# Patient Record
Sex: FEMALE | Race: OTHER | NOT HISPANIC OR LATINO
[De-identification: names, ages, dates, MRNs, and addresses within clinical notes are randomized per-mention and may not be internally consistent; named-entity substitution may affect disease eponyms.]

---

## 2020-03-09 PROBLEM — Z00.00 ENCOUNTER FOR PREVENTIVE HEALTH EXAMINATION: Status: ACTIVE | Noted: 2020-03-09

## 2020-04-27 ENCOUNTER — APPOINTMENT (OUTPATIENT)
Dept: OBGYN | Facility: CLINIC | Age: 50
End: 2020-04-27

## 2020-09-15 ENCOUNTER — RESULT REVIEW (OUTPATIENT)
Age: 50
End: 2020-09-15

## 2020-09-15 ENCOUNTER — OUTPATIENT (OUTPATIENT)
Dept: OUTPATIENT SERVICES | Facility: HOSPITAL | Age: 50
LOS: 1 days | Discharge: HOME | End: 2020-09-15
Payer: MEDICAID

## 2020-09-15 DIAGNOSIS — M54.2 CERVICALGIA: ICD-10-CM

## 2020-09-15 DIAGNOSIS — M25.511 PAIN IN RIGHT SHOULDER: ICD-10-CM

## 2020-09-15 DIAGNOSIS — M25.512 PAIN IN LEFT SHOULDER: ICD-10-CM

## 2020-09-15 PROCEDURE — 73030 X-RAY EXAM OF SHOULDER: CPT | Mod: 26,50

## 2020-09-15 PROCEDURE — 72050 X-RAY EXAM NECK SPINE 4/5VWS: CPT | Mod: 26

## 2021-02-23 ENCOUNTER — OUTPATIENT (OUTPATIENT)
Dept: OUTPATIENT SERVICES | Facility: HOSPITAL | Age: 51
LOS: 1 days | Discharge: HOME | End: 2021-02-23

## 2021-02-24 DIAGNOSIS — Z13.820 ENCOUNTER FOR SCREENING FOR OSTEOPOROSIS: ICD-10-CM

## 2021-02-24 DIAGNOSIS — F17.200 NICOTINE DEPENDENCE, UNSPECIFIED, UNCOMPLICATED: ICD-10-CM

## 2021-02-24 DIAGNOSIS — M89.9 DISORDER OF BONE, UNSPECIFIED: ICD-10-CM

## 2021-02-24 DIAGNOSIS — Z78.0 ASYMPTOMATIC MENOPAUSAL STATE: ICD-10-CM

## 2021-03-18 ENCOUNTER — APPOINTMENT (OUTPATIENT)
Dept: OTOLARYNGOLOGY | Facility: CLINIC | Age: 51
End: 2021-03-18
Payer: MEDICAID

## 2021-03-18 PROCEDURE — 92557 COMPREHENSIVE HEARING TEST: CPT

## 2021-03-18 PROCEDURE — 99203 OFFICE O/P NEW LOW 30 MIN: CPT | Mod: 25

## 2021-03-18 PROCEDURE — 92550 TYMPANOMETRY & REFLEX THRESH: CPT

## 2021-03-18 NOTE — PHYSICAL EXAM
[Midline] : trachea located in midline position [Normal] : no rashes [de-identified] : cerumen impaction bilaterally.

## 2021-03-18 NOTE — HISTORY OF PRESENT ILLNESS
[de-identified] : Patient presents today with c/o left ear tinnitus. Patient states tinnitus has been present for 2 years.Decreased hearing bilaterally.  She admits told to have wax build up in the right ear. She admits b/l otalgia. No h/o ear infections.

## 2021-07-22 ENCOUNTER — OUTPATIENT (OUTPATIENT)
Dept: OUTPATIENT SERVICES | Facility: HOSPITAL | Age: 51
LOS: 1 days | Discharge: HOME | End: 2021-07-22
Payer: MEDICAID

## 2021-07-22 DIAGNOSIS — R94.39 ABNORMAL RESULT OF OTHER CARDIOVASCULAR FUNCTION STUDY: ICD-10-CM

## 2021-07-22 PROCEDURE — 70544 MR ANGIOGRAPHY HEAD W/O DYE: CPT | Mod: 26

## 2022-02-24 ENCOUNTER — OUTPATIENT (OUTPATIENT)
Dept: OUTPATIENT SERVICES | Facility: HOSPITAL | Age: 52
LOS: 1 days | Discharge: HOME | End: 2022-02-24
Payer: MEDICAID

## 2022-02-24 DIAGNOSIS — E07.9 DISORDER OF THYROID, UNSPECIFIED: ICD-10-CM

## 2022-02-24 PROCEDURE — 76536 US EXAM OF HEAD AND NECK: CPT | Mod: 26

## 2022-03-07 ENCOUNTER — APPOINTMENT (OUTPATIENT)
Dept: OTOLARYNGOLOGY | Facility: CLINIC | Age: 52
End: 2022-03-07
Payer: MEDICAID

## 2022-03-07 PROCEDURE — 99213 OFFICE O/P EST LOW 20 MIN: CPT | Mod: 25

## 2022-03-07 PROCEDURE — 92550 TYMPANOMETRY & REFLEX THRESH: CPT

## 2022-03-07 PROCEDURE — 92557 COMPREHENSIVE HEARING TEST: CPT

## 2022-03-07 NOTE — PHYSICAL EXAM
[Normal] : mucosa is normal [Midline] : trachea located in midline position [de-identified] : left TMJ pain on palpation.

## 2022-03-07 NOTE — HISTORY OF PRESENT ILLNESS
[FreeTextEntry1] : Patient presents today with c/o hearing loss. She has hearing aids but does not use them because of the noise. Patient feels something in her ear. Pain at times.

## 2022-03-07 NOTE — ASSESSMENT
[FreeTextEntry1] : I reviewed, interpreted, and discussed the Audiogram done today. Asymmetric snhl. \par \par I explained to the patient the pathophysiology of TMJ dysfunction, causing referred otalgia. I showed the impact of an  uneven bite/occlusion. \par During painful episodes, I recommended using slightly warm compresses to relieve the spasm of the masticators muscles, eating soft food, masticating on both sides of the jaw instead of one side,  in addition to using NSAIDs. I also explained the risks of side effects related to NSAIDs including stomach ulcers and recommended gastric protection while using NSAIDs. \par I also discussed the importance of seeing the dentist to align the bite to avoid a recurrence of the problem down the road.\par

## 2022-04-11 ENCOUNTER — RESULT REVIEW (OUTPATIENT)
Age: 52
End: 2022-04-11

## 2022-04-11 ENCOUNTER — OUTPATIENT (OUTPATIENT)
Dept: OUTPATIENT SERVICES | Facility: HOSPITAL | Age: 52
LOS: 1 days | Discharge: HOME | End: 2022-04-11
Payer: MEDICAID

## 2022-04-11 DIAGNOSIS — H90.5 UNSPECIFIED SENSORINEURAL HEARING LOSS: ICD-10-CM

## 2022-04-11 PROCEDURE — 70553 MRI BRAIN STEM W/O & W/DYE: CPT | Mod: 26,76

## 2022-04-29 ENCOUNTER — APPOINTMENT (OUTPATIENT)
Dept: OTOLARYNGOLOGY | Facility: CLINIC | Age: 52
End: 2022-04-29
Payer: MEDICAID

## 2022-04-29 PROCEDURE — 99214 OFFICE O/P EST MOD 30 MIN: CPT

## 2022-04-29 NOTE — HISTORY OF PRESENT ILLNESS
[FreeTextEntry1] : Patient presents today following up on tinnitus and hearing loss. Patient continues to have tinnitus. Patient here to discuss MRI results.

## 2022-04-29 NOTE — ASSESSMENT
[FreeTextEntry1] : I personally reviewed, interpreted and discussed patient's MRI images. No CPA mass. \par

## 2023-03-08 ENCOUNTER — APPOINTMENT (OUTPATIENT)
Dept: PAIN MANAGEMENT | Facility: CLINIC | Age: 53
End: 2023-03-08
Payer: MEDICARE

## 2023-03-08 VITALS
HEART RATE: 68 BPM | HEIGHT: 59 IN | DIASTOLIC BLOOD PRESSURE: 87 MMHG | SYSTOLIC BLOOD PRESSURE: 107 MMHG | BODY MASS INDEX: 31.25 KG/M2 | WEIGHT: 155 LBS

## 2023-03-08 DIAGNOSIS — I20.8 OTHER FORMS OF ANGINA PECTORIS: ICD-10-CM

## 2023-03-08 DIAGNOSIS — Z87.39 PERSONAL HISTORY OF OTHER DISEASES OF THE MUSCULOSKELETAL SYSTEM AND CONNECTIVE TISSUE: ICD-10-CM

## 2023-03-08 DIAGNOSIS — Z86.59 PERSONAL HISTORY OF OTHER MENTAL AND BEHAVIORAL DISORDERS: ICD-10-CM

## 2023-03-08 DIAGNOSIS — Z86.79 PERSONAL HISTORY OF OTHER DISEASES OF THE CIRCULATORY SYSTEM: ICD-10-CM

## 2023-03-08 PROCEDURE — 99204 OFFICE O/P NEW MOD 45 MIN: CPT

## 2023-03-08 NOTE — REASON FOR VISIT
[Initial Visit] : an initial pain management visit [FreeTextEntry2] : PAIN ON LEFT SIDE OF THE BODY

## 2023-03-08 NOTE — ASSESSMENT
[FreeTextEntry1] : This is a 52 year old female with a history of multiple sclerosis with chief complaint of left sided body pain which has been ongoing for several years. She would like to be medically managed in our office. I will refill her above medications gabapentin, baclofen, and amitriptyline and she will follow up in 3 months for reevaluation. All this patients questions were answered and the conversation was understood well.\par \par I, Allison López, attest that this documentation has been prepared under the direction and in the presence of Provider Rea Champion MD.\par \par \par Thank you for allowing me to assist in the management of this patient. \par \par \par Best Regards, \par \par \par Rea Champion M.D., [facility]\par \par \par Diplomate, American Board of Physical Medicine and Rehabilitation\par Diplomate, American Board of Pain Medicine \par Diplomate, American Board of Pain Management\par

## 2023-03-08 NOTE — PHYSICAL EXAM
[Normal Coordination] : normal coordination [Normal DTR UE/LE] : normal DTR UE/LE  [Normal Sensation] : normal sensation [Normal Mood and Affect] : normal mood and affect [Orientated] : orientated [Able to Communicate] : able to communicate [Well Developed] : well developed [Well Nourished] : well nourished [] : lumbar paraspinal tenderness [Flexion] : flexion [Extension] : extension

## 2023-03-08 NOTE — DATA REVIEWED
[FreeTextEntry1] : SOAPP: moderate on 3/08/23\par Patient has a combination of moderate rise SOAP and no risk factors. UDS would be repeated randomly every quarter.\par \par UDS: No data obtained today.\par \par [place] REGISTRY: Checked.\par

## 2023-03-08 NOTE — HISTORY OF PRESENT ILLNESS
[FreeTextEntry1] : This is a 52 year old female here to establish care for multiple sclerosis and is experiencing pain on the entire left side of her body. She also has a history of a neuroma in the left foot. She was previously under the care of another pain management provider who is no longer covered under her insurance. The pain travels from the shoulder down to her feet. She experiences a burning sensation in the feet and there is numbness in the bottom of the heels. She would like to be medically managed at our office.  She was being prescribed Gabapentin 800 mg 3 times daily, Baclofen 10 mg 3 times daily, and Amitriptyline 10 mg at night and would like to continue with this regimen since it  provided her with good relief.  \par \par Of note, she is wearing a brace on her left hand for carpal tunnel syndrome which she still follows up with a hand specialist.\par \par The patient has had this pain for 20 years. The pain started after injury at work Patient describes pain as severe and constant. During the last month the pain has been worse during the a no typical pattern. Pain described as burning, sharp, pressure-like, dull aching, throbbing, shooting. Pain is associated with numbness and pins and needles into the upper and lower extremities. Patient has weakness in the upper lower extremities. Pain is increased with lying down. Pain is not changed with standing, sitting, walking, exercise, relaxation, coughing/sneezing, bowel movements. Bowel or bladder habits have not changed.\par \par ACTIVITIES: Patient could walk 0 blocks before the pain starts. Patient can sit 10 minutes before pain starts. Patient can stand 10 minutes before pain starts. The patient constantly lays down because of pain. Patient uses cane at this time. Patient has difficulty going to work, performing household chores, doing yd work or shopping, socializing with friends, participating in recreational activities, exercising at this time.\par \par PRIOR PAIN TREATMENTS: No relief with physical therapy.\par \par PRIOR PAIN MEDICATIONS:  Naproxen, Flexeril, baclofen, Zoloft, gabapentin.\par \par \par

## 2023-05-04 ENCOUNTER — APPOINTMENT (OUTPATIENT)
Dept: OTOLARYNGOLOGY | Facility: CLINIC | Age: 53
End: 2023-05-04
Payer: MEDICARE

## 2023-05-04 ENCOUNTER — APPOINTMENT (OUTPATIENT)
Dept: OTOLARYNGOLOGY | Facility: CLINIC | Age: 53
End: 2023-05-04

## 2023-05-04 DIAGNOSIS — H93.12 TINNITUS, LEFT EAR: ICD-10-CM

## 2023-05-04 DIAGNOSIS — H90.5 UNSPECIFIED SENSORINEURAL HEARING LOSS: ICD-10-CM

## 2023-05-04 PROCEDURE — 99213 OFFICE O/P EST LOW 20 MIN: CPT

## 2023-05-04 NOTE — HISTORY OF PRESENT ILLNESS
[Hearing Loss] : hearing loss [Ear Fullness] : ear fullness [Tinnitus] : tinnitus [None] : No associated symptoms are reported. [FreeTextEntry1] : PAtient returns today c/o tinnitus of ear , [facility]. Has been having left ear pain ,  come and goes . Constant tinnitus . Noticed hearing is a bit muffled . no discharge from left ear . Pt ahs hearing aids.  [Vertigo] : no vertigo

## 2023-05-04 NOTE — PHYSICAL EXAM
[Normal] : mucosa is normal [Midline] : trachea located in midline position [de-identified] : right cerumen

## 2023-05-22 ENCOUNTER — APPOINTMENT (OUTPATIENT)
Dept: PAIN MANAGEMENT | Facility: CLINIC | Age: 53
End: 2023-05-22
Payer: MEDICARE

## 2023-05-22 PROCEDURE — 99213 OFFICE O/P EST LOW 20 MIN: CPT

## 2023-05-22 RX ORDER — AMITRIPTYLINE HYDROCHLORIDE 10 MG/1
10 TABLET, FILM COATED ORAL
Qty: 50 | Refills: 2 | Status: DISCONTINUED | COMMUNITY
Start: 2021-03-18 | End: 2023-05-22

## 2023-05-22 NOTE — HISTORY OF PRESENT ILLNESS
[FreeTextEntry1] : This is a 52 year old female here to establish care for multiple sclerosis and is experiencing pain on the entire left side of her body. She also has a history of a neuroma in the left foot. She was previously under the care of another pain management provider who is no longer covered under her insurance. The pain travels from the shoulder down to her feet. She experiences a burning sensation in the feet and there is numbness in the bottom of the heels. She would like to be medically managed at our office.  She was being prescribed Gabapentin 800 mg 3 times daily, Baclofen 10 mg 3 times daily, and Amitriptyline 10 mg at night and would like to continue with this regimen since it  provided her with good relief.  \par \par Of note, she is wearing a brace on her left hand for carpal tunnel syndrome which she still follows up with a hand specialist.\par \par The patient has had this pain for 20 years. The pain started after injury at work Patient describes pain as severe and constant. During the last month the pain has been worse during the a no typical pattern. Pain described as burning, sharp, pressure-like, dull aching, throbbing, shooting. Pain is associated with numbness and pins and needles into the upper and lower extremities. Patient has weakness in the upper lower extremities. Pain is increased with lying down. Pain is not changed with standing, sitting, walking, exercise, relaxation, coughing/sneezing, bowel movements. Bowel or bladder habits have not changed.\par \par ACTIVITIES: Patient could walk 0 blocks before the pain starts. Patient can sit 10 minutes before pain starts. Patient can stand 10 minutes before pain starts. The patient constantly lays down because of pain. Patient uses cane at this time. Patient has difficulty going to work, performing household chores, doing yd work or shopping, socializing with friends, participating in recreational activities, exercising at this time.\par \par PRIOR PAIN TREATMENTS: No relief with physical therapy.\par \par PRIOR PAIN MEDICATIONS:  Naproxen, Flexeril, baclofen, Zoloft, gabapentin.\par \par TODAY: Last seen on 03/08/2023 and since then there has been no new complaints or acute changes to her condition.Patient reports persistent chronic pain.She also reports that current medication regimen of Gabapentin,Amitriptyline and Baclofen provide at least 50% pain relief and allows her to perform her ADLs. Therefore,we will continue with no changes.\par \par \par

## 2023-08-10 ENCOUNTER — APPOINTMENT (OUTPATIENT)
Dept: PAIN MANAGEMENT | Facility: CLINIC | Age: 53
End: 2023-08-10

## 2023-09-13 ENCOUNTER — APPOINTMENT (OUTPATIENT)
Dept: PAIN MANAGEMENT | Facility: CLINIC | Age: 53
End: 2023-09-13
Payer: MEDICARE

## 2023-09-13 PROCEDURE — 99214 OFFICE O/P EST MOD 30 MIN: CPT

## 2023-09-13 RX ORDER — AMITRIPTYLINE HYDROCHLORIDE 10 MG/1
10 TABLET, FILM COATED ORAL
Qty: 30 | Refills: 3 | Status: DISCONTINUED | COMMUNITY
Start: 2023-03-08 | End: 2023-09-13

## 2023-12-19 ENCOUNTER — APPOINTMENT (OUTPATIENT)
Dept: PAIN MANAGEMENT | Facility: CLINIC | Age: 53
End: 2023-12-19
Payer: MEDICARE

## 2023-12-19 VITALS
HEART RATE: 73 BPM | DIASTOLIC BLOOD PRESSURE: 82 MMHG | SYSTOLIC BLOOD PRESSURE: 116 MMHG | HEIGHT: 59 IN | BODY MASS INDEX: 31.25 KG/M2 | WEIGHT: 155 LBS

## 2023-12-19 PROCEDURE — 99213 OFFICE O/P EST LOW 20 MIN: CPT

## 2023-12-19 NOTE — DATA REVIEWED
[FreeTextEntry1] : SOAPP: low risk 9/13/23 Low risk: Patient has combination of a low risk SOAP and no risk factors. UDS would be repeated randomly every quarter.  UDS: No data obtained today.  [place] REGISTRY: Checked.

## 2023-12-19 NOTE — DISCUSSION/SUMMARY
[de-identified] : This is a 53-year-old female with a history of multiple sclerosis with a chief complaint of left sided body pain which has been ongoing for several years. Patient reports persistent chronic pain. She also reports that her current medication regimen of Gabapentin, Amitriptyline and Baclofen provide at least 50% pain relief and allows her to perform her ADLs. She will follow up in 3 months for routine care.  BHAVANI Glover MD

## 2023-12-19 NOTE — HISTORY OF PRESENT ILLNESS
[FreeTextEntry1] : ORIGINAL PRESENTATION: This is a 53 year old female here to establish care for multiple sclerosis and is experiencing pain on the entire left side of her body. She also has a history of a neuroma in the left foot. She was previously under the care of another pain management provider who is no longer covered under her insurance. The pain travels from the shoulder down to her feet. She experiences a burning sensation in the feet and there is numbness in the bottom of the heels. She would like to be medically managed at our office.  She was being prescribed Gabapentin 800 mg 3 times daily, Baclofen 10 mg 3 times daily, and Amitriptyline 10 mg at night and would like to continue with this regimen since it  provided her with good relief.    Of note, she is wearing a brace on her left hand for carpal tunnel syndrome which she still follows up with a hand specialist.  The patient has had this pain for 20 years. The pain started after injury at work Patient describes pain as severe and constant. During the last month the pain has been worse during the a no typical pattern. Pain described as burning, sharp, pressure-like, dull aching, throbbing, shooting. Pain is associated with numbness and pins and needles into the upper and lower extremities. Patient has weakness in the upper lower extremities. Pain is increased with lying down. Pain is not changed with standing, sitting, walking, exercise, relaxation, coughing/sneezing, bowel movements. Bowel or bladder habits have not changed.  ACTIVITIES: Patient could walk 0 blocks before the pain starts. Patient can sit 10 minutes before pain starts. Patient can stand 10 minutes before pain starts. The patient constantly lays down because of pain. Patient uses cane at this time. Patient has difficulty going to work, performing household chores, doing yd work or shopping, socializing with friends, participating in recreational activities, exercising at this time.  PRIOR PAIN TREATMENTS: No relief with physical therapy.  PRIOR PAIN MEDICATIONS:  Naproxen, Flexeril, baclofen, Zoloft, gabapentin.  TODAY: She is under our care for multiple sclerosis and continues to have pain on the entire left side of her body. Patient reports persistent chronic pain. She notes no change in the state of her pain since her previous visit. She continues on a regimen of Gabapentin 800 mg TID, Amitriptyline 100 mg and Baclofen 10 mg TID which provide at least 50% pain relief and allows her to perform her ADLs. Her Amitriptyline at 100 mg is currently being prescribed by her psychiatrist.

## 2024-03-12 ENCOUNTER — APPOINTMENT (OUTPATIENT)
Dept: PAIN MANAGEMENT | Facility: CLINIC | Age: 54
End: 2024-03-12

## 2024-03-12 ENCOUNTER — APPOINTMENT (OUTPATIENT)
Dept: PAIN MANAGEMENT | Facility: CLINIC | Age: 54
End: 2024-03-12
Payer: MEDICARE

## 2024-03-12 PROCEDURE — 99214 OFFICE O/P EST MOD 30 MIN: CPT

## 2024-03-12 NOTE — PHYSICAL EXAM
[Normal Coordination] : normal coordination [Normal DTR UE/LE] : normal DTR UE/LE  [Normal Sensation] : normal sensation [Normal Mood and Affect] : normal mood and affect [Oriented] : oriented [Able to Communicate] : able to communicate [Well Developed] : well developed [Well Nourished] : well nourished [Extension] : extension [Flexion] : flexion [] : achilles and patella reflexes are symmetrical

## 2024-03-12 NOTE — DISCUSSION/SUMMARY
[Medication Risks Reviewed] : Medication risks reviewed [de-identified] : This is a 53-year-old female with a history of multiple sclerosis with a chief complaint of left sided body pain which has been ongoing for several years. Patient reports persistent chronic pain. She also reports that her current medication regimen of Gabapentin, Amitriptyline and Baclofen provide at least 50% pain relief and allows her to perform her ADLs. We will renew her Gabapentin and Baclofen. She will follow up in 3 months for routine care.  Total clinician time spent today on the patient is 30 minutes including preparing to see the patient, obtaining and/or reviewing and confirming history, performing medically necessary and appropriate examination, counseling and educating the patient and/or family, documenting clinical information in the EHR and communicating and/or referring to other healthcare professionals.  BHAVANI Salgaod MD

## 2024-03-12 NOTE — HISTORY OF PRESENT ILLNESS
[FreeTextEntry1] : ORIGINAL PRESENTATION: This is a 53 year old female here to establish care for multiple sclerosis and is experiencing pain on the entire left side of her body. She also has a history of a neuroma in the left foot. She was previously under the care of another pain management provider who is no longer covered under her insurance. The pain travels from the shoulder down to her feet. She experiences a burning sensation in the feet and there is numbness in the bottom of the heels. She would like to be medically managed at our office.  She was being prescribed Gabapentin 800 mg 3 times daily, Baclofen 10 mg 3 times daily, and Amitriptyline 10 mg at night and would like to continue with this regimen since it  provided her with good relief.    Of note, she is wearing a brace on her left hand for carpal tunnel syndrome which she still follows up with a hand specialist.  The patient has had this pain for 20 years. The pain started after injury at work Patient describes pain as severe and constant. During the last month the pain has been worse during the a no typical pattern. Pain described as burning, sharp, pressure-like, dull aching, throbbing, shooting. Pain is associated with numbness and pins and needles into the upper and lower extremities. Patient has weakness in the upper lower extremities. Pain is increased with lying down. Pain is not changed with standing, sitting, walking, exercise, relaxation, coughing/sneezing, bowel movements. Bowel or bladder habits have not changed.  ACTIVITIES: Patient could walk 0 blocks before the pain starts. Patient can sit 10 minutes before pain starts. Patient can stand 10 minutes before pain starts. The patient constantly lays down because of pain. Patient uses cane at this time. Patient has difficulty going to work, performing household chores, doing yd work or shopping, socializing with friends, participating in recreational activities, exercising at this time.  PRIOR PAIN TREATMENTS: No relief with physical therapy.  PRIOR PAIN MEDICATIONS:  Naproxen, Flexeril, baclofen, Zoloft, gabapentin.  TODAY:  Last seen on 12/19/2023 and since then there has been no new complaints or acute changes. She is under our care for multiple sclerosis and continues to have pain on the entire left side of her body. Patient reports persistent chronic pain. She notes no change in the state of her pain since her previous visit. She continues on a regimen of Gabapentin 800 mg TID, Amitriptyline 100 mg and Baclofen 10 mg TID which provide at least 50% pain relief and allows her to perform her ADLs. Her Amitriptyline at 100 mg is currently being prescribed by her psychiatrist. She is also complains of severe Migraines for which she sees Neurologist and is treated with Sumatriptan.

## 2024-06-11 ENCOUNTER — APPOINTMENT (OUTPATIENT)
Dept: PAIN MANAGEMENT | Facility: CLINIC | Age: 54
End: 2024-06-11
Payer: MEDICARE

## 2024-06-11 DIAGNOSIS — G89.29 OTHER CHRONIC PAIN: ICD-10-CM

## 2024-06-11 DIAGNOSIS — G35 MULTIPLE SCLEROSIS: ICD-10-CM

## 2024-06-11 PROCEDURE — 99214 OFFICE O/P EST MOD 30 MIN: CPT

## 2024-06-11 RX ORDER — GABAPENTIN 800 MG/1
800 TABLET, COATED ORAL 3 TIMES DAILY
Qty: 90 | Refills: 3 | Status: ACTIVE | COMMUNITY
Start: 2023-03-08 | End: 1900-01-01

## 2024-06-11 RX ORDER — BACLOFEN 10 MG/1
10 TABLET ORAL 3 TIMES DAILY
Qty: 90 | Refills: 3 | Status: ACTIVE | COMMUNITY
Start: 2023-03-08 | End: 1900-01-01

## 2024-06-11 NOTE — PHYSICAL EXAM
[Normal Coordination] : normal coordination [Normal DTR UE/LE] : normal DTR UE/LE  [Normal Sensation] : normal sensation [Normal Mood and Affect] : normal mood and affect [Oriented] : oriented [Able to Communicate] : able to communicate [Well Developed] : well developed [Well Nourished] : well nourished [Flexion] : flexion [Extension] : extension [] : patient ambulates without assistive device

## 2024-06-11 NOTE — HISTORY OF PRESENT ILLNESS
[FreeTextEntry1] : ORIGINAL PRESENTATION: This is a 53 year old female here to establish care for multiple sclerosis and is experiencing pain on the entire left side of her body. She also has a history of a neuroma in the left foot. She was previously under the care of another pain management provider who is no longer covered under her insurance. The pain travels from the shoulder down to her feet. She experiences a burning sensation in the feet and there is numbness in the bottom of the heels. She would like to be medically managed at our office.  She was being prescribed Gabapentin 800 mg 3 times daily, Baclofen 10 mg 3 times daily, and Amitriptyline 10 mg at night and would like to continue with this regimen since it  provided her with good relief.    Of note, she is wearing a brace on her left hand for carpal tunnel syndrome which she still follows up with a hand specialist.  The patient has had this pain for 20 years. The pain started after injury at work Patient describes pain as severe and constant. During the last month the pain has been worse during the a no typical pattern. Pain described as burning, sharp, pressure-like, dull aching, throbbing, shooting. Pain is associated with numbness and pins and needles into the upper and lower extremities. Patient has weakness in the upper lower extremities. Pain is increased with lying down. Pain is not changed with standing, sitting, walking, exercise, relaxation, coughing/sneezing, bowel movements. Bowel or bladder habits have not changed.  ACTIVITIES: Patient could walk 0 blocks before the pain starts. Patient can sit 10 minutes before pain starts. Patient can stand 10 minutes before pain starts. The patient constantly lays down because of pain. Patient uses cane at this time. Patient has difficulty going to work, performing household chores, doing yd work or shopping, socializing with friends, participating in recreational activities, exercising at this time.  PRIOR PAIN TREATMENTS: No relief with physical therapy.  PRIOR PAIN MEDICATIONS:  Naproxen, Flexeril, baclofen, Zoloft, gabapentin.  TODAY:  Last seen on 03/12/2024 and since then there has been no new complaints or acute changes. She is under our care for multiple sclerosis and continues to have persistent and severe at times pain on the entire left side of her body. Patient reports persistent chronic pain. She notes no change in the state of her pain since her previous visit. She continues on a regimen of Gabapentin 800 mg TID, Amitriptyline 100 mg and Baclofen 10 mg TID which provide at least 50% pain relief and allows her to perform her ADLs. Her Amitriptyline at 100 mg is currently being prescribed by her psychiatrist. She is also complains of severe Migraines for which she sees Neurologist and is treated with Sumatriptan.

## 2024-06-11 NOTE — DISCUSSION/SUMMARY
[Medication Risks Reviewed] : Medication risks reviewed [de-identified] : This is a 53-year-old female with a history of multiple sclerosis with a chief complaint of left sided body pain which has been ongoing for several years. Patient reports persistent chronic pain. She also reports that her current medication regimen of Gabapentin, Amitriptyline and Baclofen provide at least 50% pain relief and allows her to perform her ADLs. We will renew her Gabapentin and Baclofen. She will follow up in 3 months for routine care.  Total clinician time spent today on the patient is 30 minutes including preparing to see the patient, obtaining and/or reviewing and confirming history, performing medically necessary and appropriate examination, counseling and educating the patient and/or family, documenting clinical information in the EHR and communicating and/or referring to other healthcare professionals.  BHAVANI Salgado MD

## 2024-09-12 ENCOUNTER — APPOINTMENT (OUTPATIENT)
Dept: PAIN MANAGEMENT | Facility: CLINIC | Age: 54
End: 2024-09-12
Payer: MEDICARE

## 2024-09-12 DIAGNOSIS — M62.838 OTHER MUSCLE SPASM: ICD-10-CM

## 2024-09-12 DIAGNOSIS — G89.29 OTHER CHRONIC PAIN: ICD-10-CM

## 2024-09-12 DIAGNOSIS — G35 MULTIPLE SCLEROSIS: ICD-10-CM

## 2024-09-12 DIAGNOSIS — M54.2 CERVICALGIA: ICD-10-CM

## 2024-09-12 PROCEDURE — 99215 OFFICE O/P EST HI 40 MIN: CPT

## 2024-09-12 RX ORDER — METHOCARBAMOL 750 MG/1
750 TABLET, FILM COATED ORAL 3 TIMES DAILY
Qty: 90 | Refills: 2 | Status: ACTIVE | COMMUNITY
Start: 2024-09-12 | End: 1900-01-01

## 2024-09-12 NOTE — DISCUSSION/SUMMARY
[de-identified] : This is a 54-year-old female with a history of multiple sclerosis with a chief complaint of left sided body pain which has been ongoing for several years. Patient reports persistent chronic pain. Today, she is complaining of significant neck stiffness and spasms. She is having difficulty moving her neck from side to side. She will start physical therapy for her cervical spine. We briefly discussed cervical TPI's, which may be of benefit in the future. I have switched her from Baclofen to methocarbamol 750 mg TID. She will continue her current medication regimen of Gabapentin and Amitriptyline which provide at least 50% pain relief and allows her to perform her ADLs. She will follow up in 2 months for reevaluation.  Physical therapy of the cervical spine 2-3 times a week for 4-8 weeks stressing a home exercise program of walking, shoulder girdle strengthening,  swimming, elliptical , recumbent bike, Nirav chi and Yoga. Use things that heat like hot shower or icy heat before rehab, exercising and at the beginning of the day, and ice (ice in a bag never directly on the skin) after activity and at the end of the day.  BHAVANI Glover MD

## 2024-09-12 NOTE — PHYSICAL EXAM
[Normal Coordination] : normal coordination [Normal DTR UE/LE] : normal DTR UE/LE  [Normal Sensation] : normal sensation [Normal Mood and Affect] : normal mood and affect [Oriented] : oriented [Able to Communicate] : able to communicate [Well Developed] : well developed [Well Nourished] : well nourished [Flexion] : flexion [Extension] : extension [Rotation to left] : rotation to left [Rotation to right] : rotation to right [de-identified] : extension 20 degrees [de-identified] : left lateral flexion 30 degrees [de-identified] : left lateral rotation 60 degrees [de-identified] : right lateral flexion 10 degrees [] : no swelling

## 2024-09-12 NOTE — HISTORY OF PRESENT ILLNESS
[FreeTextEntry1] : ORIGINAL PRESENTATION: This is a 54 year old female here to establish care for multiple sclerosis and is experiencing pain on the entire left side of her body. She also has a history of a neuroma in the left foot. She was previously under the care of another pain management provider who is no longer covered under her insurance. The pain travels from the shoulder down to her feet. She experiences a burning sensation in the feet and there is numbness in the bottom of the heels. She would like to be medically managed at our office.  She was being prescribed Gabapentin 800 mg 3 times daily, Baclofen 10 mg 3 times daily, and Amitriptyline 10 mg at night and would like to continue with this regimen since it  provided her with good relief.    Of note, she is wearing a brace on her left hand for carpal tunnel syndrome which she still follows up with a hand specialist.  The patient has had this pain for 20 years. The pain started after injury at work Patient describes pain as severe and constant. During the last month the pain has been worse during the a no typical pattern. Pain described as burning, sharp, pressure-like, dull aching, throbbing, shooting. Pain is associated with numbness and pins and needles into the upper and lower extremities. Patient has weakness in the upper lower extremities. Pain is increased with lying down. Pain is not changed with standing, sitting, walking, exercise, relaxation, coughing/sneezing, bowel movements. Bowel or bladder habits have not changed.  ACTIVITIES: Patient could walk 0 blocks before the pain starts. Patient can sit 10 minutes before pain starts. Patient can stand 10 minutes before pain starts. The patient constantly lays down because of pain. Patient uses cane at this time. Patient has difficulty going to work, performing household chores, doing yd work or shopping, socializing with friends, participating in recreational activities, exercising at this time.  PRIOR PAIN TREATMENTS: No relief with physical therapy.  PRIOR PAIN MEDICATIONS:  Naproxen, Flexeril, baclofen, Zoloft, gabapentin.  TODAY: Patient presents for a revisit encounter. She is under our care for multiple sclerosis and continues to have persistent on the entire left side of her body. At times, it gets to be severe. Today, she is complaining of significant neck stiffness and spasms. She is having difficulty moving her neck from side to side. She has been applying heat, which has been helping somewhat. She denies upper extremity radiculopathy. She takes Baclofen 10 mg which was previously helping; however, hasn't been helping her as of late. She continues on a regimen of Gabapentin 800 mg TID and Amitriptyline 100 mg which provides at least 50% pain relief and allows her to perform her ADLs. Her Amitriptyline 100 mg is currently being prescribed by her psychiatrist.

## 2024-11-07 ENCOUNTER — APPOINTMENT (OUTPATIENT)
Dept: PAIN MANAGEMENT | Facility: CLINIC | Age: 54
End: 2024-11-07
Payer: MEDICARE

## 2024-11-07 DIAGNOSIS — M62.838 OTHER MUSCLE SPASM: ICD-10-CM

## 2024-11-07 DIAGNOSIS — G35 MULTIPLE SCLEROSIS: ICD-10-CM

## 2024-11-07 DIAGNOSIS — G89.29 OTHER CHRONIC PAIN: ICD-10-CM

## 2024-11-07 DIAGNOSIS — M54.2 CERVICALGIA: ICD-10-CM

## 2024-11-07 PROCEDURE — 99214 OFFICE O/P EST MOD 30 MIN: CPT

## 2024-11-07 RX ORDER — TIZANIDINE 4 MG/1
4 TABLET ORAL 3 TIMES DAILY
Qty: 90 | Refills: 0 | Status: ACTIVE | COMMUNITY
Start: 2024-11-07 | End: 1900-01-01

## 2024-11-07 RX ORDER — BENZTROPINE MESYLATE 2 MG/1
TABLET ORAL
Refills: 0 | Status: ACTIVE | COMMUNITY

## 2024-11-14 ENCOUNTER — APPOINTMENT (OUTPATIENT)
Dept: PAIN MANAGEMENT | Facility: CLINIC | Age: 54
End: 2024-11-14

## 2024-12-10 ENCOUNTER — APPOINTMENT (OUTPATIENT)
Dept: PAIN MANAGEMENT | Facility: CLINIC | Age: 54
End: 2024-12-10
Payer: MEDICARE

## 2024-12-10 VITALS — BODY MASS INDEX: 28.22 KG/M2 | HEIGHT: 59 IN | WEIGHT: 140 LBS

## 2024-12-10 DIAGNOSIS — G89.29 OTHER CHRONIC PAIN: ICD-10-CM

## 2024-12-10 DIAGNOSIS — G35 MULTIPLE SCLEROSIS: ICD-10-CM

## 2024-12-10 DIAGNOSIS — M54.6 PAIN IN THORACIC SPINE: ICD-10-CM

## 2024-12-10 DIAGNOSIS — M54.2 CERVICALGIA: ICD-10-CM

## 2024-12-10 PROCEDURE — 99214 OFFICE O/P EST MOD 30 MIN: CPT

## 2024-12-19 ENCOUNTER — APPOINTMENT (OUTPATIENT)
Dept: MRI IMAGING | Facility: CLINIC | Age: 54
End: 2024-12-19
Payer: MEDICARE

## 2024-12-19 PROCEDURE — 72146 MRI CHEST SPINE W/O DYE: CPT

## 2025-01-07 ENCOUNTER — APPOINTMENT (OUTPATIENT)
Dept: PAIN MANAGEMENT | Facility: CLINIC | Age: 55
End: 2025-01-07

## 2025-02-25 ENCOUNTER — APPOINTMENT (OUTPATIENT)
Dept: PAIN MANAGEMENT | Facility: CLINIC | Age: 55
End: 2025-02-25

## 2025-03-04 ENCOUNTER — APPOINTMENT (OUTPATIENT)
Dept: PAIN MANAGEMENT | Facility: CLINIC | Age: 55
End: 2025-03-04
Payer: MEDICARE

## 2025-03-04 DIAGNOSIS — M54.6 PAIN IN THORACIC SPINE: ICD-10-CM

## 2025-03-04 DIAGNOSIS — M54.2 CERVICALGIA: ICD-10-CM

## 2025-03-04 DIAGNOSIS — M62.838 OTHER MUSCLE SPASM: ICD-10-CM

## 2025-03-04 PROCEDURE — 99214 OFFICE O/P EST MOD 30 MIN: CPT

## 2025-03-11 ENCOUNTER — APPOINTMENT (OUTPATIENT)
Dept: NEUROLOGY | Facility: CLINIC | Age: 55
End: 2025-03-11
Payer: MEDICARE

## 2025-03-11 VITALS
HEART RATE: 76 BPM | HEIGHT: 59 IN | SYSTOLIC BLOOD PRESSURE: 111 MMHG | WEIGHT: 150 LBS | BODY MASS INDEX: 30.24 KG/M2 | DIASTOLIC BLOOD PRESSURE: 72 MMHG

## 2025-03-11 DIAGNOSIS — G89.29 OTHER CHRONIC PAIN: ICD-10-CM

## 2025-03-11 DIAGNOSIS — G35 MULTIPLE SCLEROSIS: ICD-10-CM

## 2025-03-11 PROCEDURE — 99204 OFFICE O/P NEW MOD 45 MIN: CPT

## 2025-03-11 RX ORDER — AMITRIPTYLINE HYDROCHLORIDE 100 MG/1
100 TABLET, FILM COATED ORAL
Refills: 0 | Status: ACTIVE | COMMUNITY

## 2025-03-11 RX ORDER — ARIPIPRAZOLE 20 MG/1
20 TABLET ORAL
Refills: 0 | Status: ACTIVE | COMMUNITY

## 2025-03-11 RX ORDER — AMANTADINE HYDROCHLORIDE 100 MG/1
100 CAPSULE ORAL
Refills: 0 | Status: ACTIVE | COMMUNITY

## 2025-03-11 RX ORDER — SUMATRIPTAN 50 MG/1
50 TABLET, FILM COATED ORAL
Refills: 0 | Status: ACTIVE | COMMUNITY

## 2025-03-11 RX ORDER — BACLOFEN 10 MG/1
10 TABLET ORAL
Refills: 0 | Status: ACTIVE | COMMUNITY

## 2025-03-11 RX ORDER — BENZTROPINE MESYLATE 0.5 MG/1
0.5 TABLET ORAL
Refills: 0 | Status: ACTIVE | COMMUNITY

## 2025-03-11 RX ORDER — DULOXETINE HYDROCHLORIDE 30 MG/1
30 CAPSULE, DELAYED RELEASE PELLETS ORAL
Refills: 0 | Status: ACTIVE | COMMUNITY

## 2025-03-12 ENCOUNTER — APPOINTMENT (OUTPATIENT)
Dept: ORTHOPEDIC SURGERY | Facility: CLINIC | Age: 55
End: 2025-03-12

## 2025-03-29 ENCOUNTER — RESULT REVIEW (OUTPATIENT)
Age: 55
End: 2025-03-29

## 2025-03-29 ENCOUNTER — OUTPATIENT (OUTPATIENT)
Dept: OUTPATIENT SERVICES | Facility: HOSPITAL | Age: 55
LOS: 1 days | End: 2025-03-29
Payer: MEDICARE

## 2025-03-29 DIAGNOSIS — G35 MULTIPLE SCLEROSIS: ICD-10-CM

## 2025-03-29 DIAGNOSIS — Z00.8 ENCOUNTER FOR OTHER GENERAL EXAMINATION: ICD-10-CM

## 2025-03-29 PROCEDURE — 70553 MRI BRAIN STEM W/O & W/DYE: CPT

## 2025-03-29 PROCEDURE — 72156 MRI NECK SPINE W/O & W/DYE: CPT

## 2025-03-29 PROCEDURE — A9579: CPT

## 2025-03-29 PROCEDURE — 70553 MRI BRAIN STEM W/O & W/DYE: CPT | Mod: 26

## 2025-03-29 PROCEDURE — 72156 MRI NECK SPINE W/O & W/DYE: CPT | Mod: 26

## 2025-03-30 DIAGNOSIS — G35 MULTIPLE SCLEROSIS: ICD-10-CM

## 2025-03-31 ENCOUNTER — TRANSCRIPTION ENCOUNTER (OUTPATIENT)
Age: 55
End: 2025-03-31

## 2025-05-06 ENCOUNTER — APPOINTMENT (OUTPATIENT)
Dept: NEUROLOGY | Facility: CLINIC | Age: 55
End: 2025-05-06
Payer: MEDICARE

## 2025-05-06 VITALS
BODY MASS INDEX: 33.26 KG/M2 | HEART RATE: 77 BPM | SYSTOLIC BLOOD PRESSURE: 114 MMHG | DIASTOLIC BLOOD PRESSURE: 80 MMHG | HEIGHT: 59 IN | WEIGHT: 165 LBS

## 2025-05-06 DIAGNOSIS — G89.29 OTHER CHRONIC PAIN: ICD-10-CM

## 2025-05-06 DIAGNOSIS — G35 MULTIPLE SCLEROSIS: ICD-10-CM

## 2025-05-06 PROCEDURE — 99213 OFFICE O/P EST LOW 20 MIN: CPT

## 2025-06-03 ENCOUNTER — APPOINTMENT (OUTPATIENT)
Dept: PAIN MANAGEMENT | Facility: CLINIC | Age: 55
End: 2025-06-03